# Patient Record
Sex: MALE | URBAN - METROPOLITAN AREA
[De-identification: names, ages, dates, MRNs, and addresses within clinical notes are randomized per-mention and may not be internally consistent; named-entity substitution may affect disease eponyms.]

---

## 2024-04-16 ENCOUNTER — HOSPITAL ENCOUNTER (OUTPATIENT)
Dept: TELEMEDICINE | Facility: HOSPITAL | Age: 79
Discharge: HOME | End: 2024-04-16

## 2024-04-16 NOTE — CARE UPDATE
Stroke code received at 7:51 p.m. for difficulty walking and falls.  Symptom onset was reportedly a few days ago.  Stroke code cancelled. Recommend MRI brain and Gen Neuro/Vasc neuro consult as appropriate.          Jose R Daniles MD  Vascular Neurology  [facility]